# Patient Record
Sex: FEMALE | Race: WHITE | Employment: FULL TIME | URBAN - METROPOLITAN AREA
[De-identification: names, ages, dates, MRNs, and addresses within clinical notes are randomized per-mention and may not be internally consistent; named-entity substitution may affect disease eponyms.]

---

## 2017-07-06 ENCOUNTER — OFFICE VISIT (OUTPATIENT)
Dept: FAMILY MEDICINE CLINIC | Age: 27
End: 2017-07-06

## 2017-07-06 ENCOUNTER — HOSPITAL ENCOUNTER (OUTPATIENT)
Dept: LAB | Age: 27
Discharge: HOME | End: 2017-07-06
Payer: COMMERCIAL

## 2017-07-06 VITALS
TEMPERATURE: 98.2 F | OXYGEN SATURATION: 98 % | HEART RATE: 64 BPM | HEIGHT: 69 IN | WEIGHT: 230 LBS | SYSTOLIC BLOOD PRESSURE: 138 MMHG | BODY MASS INDEX: 34.07 KG/M2 | RESPIRATION RATE: 16 BRPM | DIASTOLIC BLOOD PRESSURE: 86 MMHG

## 2017-07-06 DIAGNOSIS — F43.9 STRESS: ICD-10-CM

## 2017-07-06 DIAGNOSIS — Z13.1 SCREENING FOR DIABETES MELLITUS (DM): ICD-10-CM

## 2017-07-06 DIAGNOSIS — I10 ESSENTIAL HYPERTENSION: ICD-10-CM

## 2017-07-06 DIAGNOSIS — Z86.59 HISTORY OF DEPRESSION: ICD-10-CM

## 2017-07-06 DIAGNOSIS — I10 ESSENTIAL HYPERTENSION: Primary | ICD-10-CM

## 2017-07-06 DIAGNOSIS — R00.2 PALPITATION: ICD-10-CM

## 2017-07-06 DIAGNOSIS — Z13.220 SCREENING FOR LIPID DISORDERS: ICD-10-CM

## 2017-07-06 LAB
ATRIAL RATE: 61 BPM
CALCULATED P AXIS, ECG09: 11 DEGREES
CALCULATED R AXIS, ECG10: 33 DEGREES
CALCULATED T AXIS, ECG11: 22 DEGREES
DIAGNOSIS, 93000: NORMAL
P-R INTERVAL, ECG05: 156 MS
Q-T INTERVAL, ECG07: 418 MS
QRS DURATION, ECG06: 90 MS
QTC CALCULATION (BEZET), ECG08: 420 MS
VENTRICULAR RATE, ECG03: 61 BPM

## 2017-07-06 PROCEDURE — 93005 ELECTROCARDIOGRAM TRACING: CPT

## 2017-07-06 NOTE — MR AVS SNAPSHOT
Visit Information Date & Time Provider Department Dept. Phone Encounter #  
 7/6/2017  1:00 PM Alton Flores, [address] [id number] Follow-up Instructions Return in about 2 weeks (around 7/20/2017), or if symptoms worsen or fail to improve. Upcoming Health Maintenance Date Due  
 PAP AKA CERVICAL CYTOLOGY 1/14/2009 INFLUENZA AGE 9 TO ADULT 8/1/2017 DTaP/Tdap/Td series (2 - Td) 9/9/2025 Allergies as of 7/6/2017  Review Complete On: 7/6/2017 By: Alton Flores MD  
 No Known Allergies Current Immunizations  Reviewed on 8/2/2015 Name Date Influenza Vaccine 10/8/2015 TB Skin Test (PPD) Intradermal 2/17/2016 Tdap 9/9/2015 Not reviewed this visit You Were Diagnosed With   
  
 Codes Comments Essential hypertension    -  Primary ICD-10-CM: I10 
ICD-9-CM: 401.9 Screening for lipid disorders     ICD-10-CM: Z13.220 ICD-9-CM: V77.91 Screening for diabetes mellitus (DM)     ICD-10-CM: Z13.1 ICD-9-CM: V77.1 Stress     ICD-10-CM: F43.9 ICD-9-CM: V62.89 History of depression     ICD-10-CM: Z86.59 
ICD-9-CM: V11.8 Palpitation     ICD-10-CM: R00.2 ICD-9-CM: 785.1 Vitals BP Pulse Temp Resp Height(growth percentile) Weight(growth percentile) 138/86 (BP 1 Location: Left arm, BP Patient Position: Sitting) 64 98.2 °F (36.8 °C) (Oral) 16 5' 9\" (1.753 m) 230 lb (104.3 kg) LMP SpO2 Breastfeeding? BMI OB Status Smoking Status 07/06/2017 (Approximate) 98% No 33.97 kg/m2 Having regular periods Never Smoker Vitals History BMI and BSA Data Body Mass Index Body Surface Area  
 33.97 kg/m 2 2.25 m 2 Preferred Pharmacy Pharmacy Name Phone [phone], [address] [phone] Your Updated Medication List  
  
   
This list is accurate as of: 7/6/17  1:37 PM.  Always use your most recent med list.  
  
  
  
  
 chlorthalidone 25 mg tablet Commonly known as:  Shearon Dayhoff Take 1 Tab by mouth daily. sertraline 50 mg tablet Commonly known as:  ZOLOFT Take 1 Tab by mouth daily. Follow-up Instructions Return in about 2 weeks (around 7/20/2017), or if symptoms worsen or fail to improve. To-Do List   
 07/06/2017 ECG:  EKG, 12 LEAD, INITIAL   
  
 07/06/2017 Lab:  HEMOGLOBIN A1C W/O EAG   
  
 07/06/2017 Lab:  LIPID PANEL   
  
 07/06/2017 Lab:  METABOLIC PANEL, COMPREHENSIVE   
  
 07/06/2017 Lab:  TSH 3RD GENERATION Referral Information Referral ID Referred By Referred To  
  
 [id number] Kishan Vivian PARRY Not Available Visits Status Start Date End Date 1 New Request 7/6/17 7/6/18 If your referral has a status of pending review or denied, additional information will be sent to support the outcome of this decision. Patient Instructions DASH Diet: Care Instructions Your Care Instructions The DASH diet is an eating plan that can help lower your blood pressure. DASH stands for Dietary Approaches to Stop Hypertension. Hypertension is high blood pressure. The DASH diet focuses on eating foods that are high in calcium, potassium, and magnesium. These nutrients can lower blood pressure. The foods that are highest in these nutrients are fruits, vegetables, low-fat dairy products, nuts, seeds, and legumes. But taking calcium, potassium, and magnesium supplements instead of eating foods that are high in those nutrients does not have the same effect. The DASH diet also includes whole grains, fish, and poultry. The DASH diet is one of several lifestyle changes your doctor may recommend to lower your high blood pressure. Your doctor may also want you to decrease the amount of sodium in your diet. Lowering sodium while following the DASH diet can lower blood pressure even further than just the DASH diet alone. Follow-up care is a key part of your treatment and safety. Be sure to make and go to all appointments, and call your doctor if you are having problems. It's also a good idea to know your test results and keep a list of the medicines you take. How can you care for yourself at home? Following the DASH diet · Eat 4 to 5 servings of fruit each day. A serving is 1 medium-sized piece of fruit, ½ cup chopped or canned fruit, 1/4 cup dried fruit, or 4 ounces (½ cup) of fruit juice. Choose fruit more often than fruit juice. · Eat 4 to 5 servings of vegetables each day. A serving is 1 cup of lettuce or raw leafy vegetables, ½ cup of chopped or cooked vegetables, or 4 ounces (½ cup) of vegetable juice. Choose vegetables more often than vegetable juice. · Get 2 to 3 servings of low-fat and fat-free dairy each day. A serving is 8 ounces of milk, 1 cup of yogurt, or 1 ½ ounces of cheese. · Eat 6 to 8 servings of grains each day. A serving is 1 slice of bread, 1 ounce of dry cereal, or ½ cup of cooked rice, pasta, or cooked cereal. Try to choose whole-grain products as much as possible. · Limit lean meat, poultry, and fish to 2 servings each day. A serving is 3 ounces, about the size of a deck of cards. · Eat 4 to 5 servings of nuts, seeds, and legumes (cooked dried beans, lentils, and split peas) each week. A serving is 1/3 cup of nuts, 2 tablespoons of seeds, or ½ cup of cooked beans or peas. · Limit fats and oils to 2 to 3 servings each day. A serving is 1 teaspoon of vegetable oil or 2 tablespoons of salad dressing. · Limit sweets and added sugars to 5 servings or less a week. A serving is 1 tablespoon jelly or jam, ½ cup sorbet, or 1 cup of lemonade. · Eat less than 2,300 milligrams (mg) of sodium a day. If you limit your sodium to 1,500 mg a day, you can lower your blood pressure even more. Tips for success · Start small.  Do not try to make dramatic changes to your diet all at once. You might feel that you are missing out on your favorite foods and then be more likely to not follow the plan. Make small changes, and stick with them. Once those changes become habit, add a few more changes. · Try some of the following: ¨ Make it a goal to eat a fruit or vegetable at every meal and at snacks. This will make it easy to get the recommended amount of fruits and vegetables each day. ¨ Try yogurt topped with fruit and nuts for a snack or healthy dessert. ¨ Add lettuce, tomato, cucumber, and onion to sandwiches. ¨ Combine a ready-made pizza crust with low-fat mozzarella cheese and lots of vegetable toppings. Try using tomatoes, squash, spinach, broccoli, carrots, cauliflower, and onions. ¨ Have a variety of cut-up vegetables with a low-fat dip as an appetizer instead of chips and dip. ¨ Sprinkle sunflower seeds or chopped almonds over salads. Or try adding chopped walnuts or almonds to cooked vegetables. ¨ Try some vegetarian meals using beans and peas. Add garbanzo or kidney beans to salads. Make burritos and tacos with mashed saxena beans or black beans. Where can you learn more? Go to http://neeraj-sue.info/. Enter I692 in the search box to learn more about \"DASH Diet: Care Instructions. \" Current as of: April 3, 2017 Content Version: 11.3 © 9920-9454 [employer]etch [employer]. Care instructions adapted under license by [employer] (which disclaims liability or warranty for this information). If you have questions about a medical condition or this instruction, always ask your healthcare professional. [place] [address] any warranty or liability for your use of this information. Introducing [facility]! Lan Cha introduces [employer] patient portal. Now you can access parts of your medical record, email your doctor's office, and request medication refills online.    
 
1. In your internet browser, go to [email or web address] 2. Click on the First Time User? Click Here link in the Sign In box. You will see the New Member Sign Up page. 3. Enter your [employer] Access Code exactly as it appears below. You will not need to use this code after youve completed the sign-up process. If you do not sign up before the expiration date, you must request a new code. · [employer] Access Code: [email or web address] Expires: 10/4/2017  1:34 PM 
 
4. Enter the last four digits of your Social Security Number (xxxx) and Date of Birth (mm/dd/yyyy) as indicated and click Submit. You will be taken to the next sign-up page. 5. Create a Newzstandt ID. This will be your [employer] login ID and cannot be changed, so think of one that is secure and easy to remember. 6. Create a [employer] password. You can change your password at any time. 7. Enter your Password Reset Question and Answer. This can be used at a later time if you forget your password. 8. Enter your e-mail address. You will receive e-mail notification when new information is available in [address] 9. Click Sign Up. You can now view and download portions of your medical record. 10. Click the Download Summary menu link to download a portable copy of your medical information. If you have questions, please visit the Frequently Asked Questions section of the [employer] website. Remember, [employer] is NOT to be used for urgent needs. For medical emergencies, dial 911. Now available from your iPhone and Android! Please provide this summary of care documentation to your next provider. Your primary care clinician is listed as Xiomara Rodriguez. If you have any questions after today's visit, please call [phone].

## 2017-07-06 NOTE — PATIENT INSTRUCTIONS

## 2017-07-06 NOTE — PROGRESS NOTES
Chief Complaint   Patient presents with    Headache    Blood Pressure Check     159/80's     1. Have you been to the ER, urgent care clinic since your last visit? Hospitalized since your last visit? No    2. Have you seen or consulted any other health care providers outside of the [address] [place] [address] since your last visit? Include any pap smears or colon screening.  No

## 2017-07-06 NOTE — PROGRESS NOTES
Thuan Caldera, 32 y.o.,  female    SUBJECTIVE  [facility], was seeing dr. Kerri florian towards end of her pregnancy 2 years ago. Was on hygroten which she discontinued a year ago, was lost to ff-up. She reports occasional headache and palpitations. She checked with [employer] [facility] recently. She denies cp, sob, lightheadedness or syncope. She has had episodes of  palpitations lasting few seconds described as pounding chest up to her throat few nights ago while laying in bed. She says diet needs improvement and aware of low salt diet recommendation. Also reports history of depression after birth of baby was on zoloft. She has discontinued medication and would rather do counseling. She is currently seeing therapist at [facility]. ROS:  See HPI, all others negative        Patient Active Problem List   Diagnosis Code    Hypertension in pregnancy, pre-existing, with delivery O10.92       Current Outpatient Prescriptions   Medication Sig Dispense Refill    chlorthalidone (HYGROTEN) 25 mg tablet Take 1 Tab by mouth daily. 90 Tab 1    sertraline (ZOLOFT) 50 mg tablet Take 1 Tab by mouth daily. 80 Tab 1       No Known Allergies    Past Medical History:   Diagnosis Date    Essential hypertension 11/2014    gestational hypertension; baby delivered 7/31/2015       Social History     Social History    Marital status:      Spouse name: N/A    Number of children: N/A    Years of education: N/A     Occupational History    Not on file.      Social History Main Topics    Smoking status: Never Smoker    Smokeless tobacco: Never Used    Alcohol use No      Comment: socially    Drug use: No    Sexual activity: Yes     Partners: Male     Birth control/ protection: None     Other Topics Concern    Not on file     Social History Narrative       Family History   Problem Relation Age of Onset    Hypertension Mother          OBJECTIVE    Physical Exam:     Visit Vitals    /86 (BP 1 Location: Left arm, BP Patient Position: Sitting)    Pulse 64    Temp 98.2 °F (36.8 °C) (Oral)    Resp 16    Ht 5' 9\" (1.753 m)    Wt 230 lb (104.3 kg)    LMP 07/06/2017 (Approximate)    SpO2 98%    Breastfeeding No    BMI 33.97 kg/m2       General: alert, well-appearing, obese, in no apparent distress or pain  Head: atraumatic. Non-tender maxillary and frontal sinuses  Eyes: Lids with no discharge, no matting, conjunctivae clear and non injected, full EOMs, PERLLA  Ears: pinna non-tender, external auditory canal patent, TM intact  Mouth/throat:tonsils non enlarged, pharynx non erythematous and no lesion, nasal mucosa normal  Neck: supple, no adenopathy palpated  CVS: normal rate, regular rhythm, distinct S1 and S2  Lungs:clear to ausculation bilaterally, no crackles, wheezing or rhonchi noted  Abdomen: normoactive bowel sounds, soft, non-tender  Extremities: no edema, no cyanosis,  Skin: warm, no lesions, rashes noted  Psych:  mood and affect normal        ASSESSMENT/PLAN  Samina Dunne was seen today for headache and blood pressure check. Diagnoses and all orders for this visit:    Essential hypertension  Off hygroten past year  Advised BP Log, DASH diet  -     METABOLIC PANEL, COMPREHENSIVE; Future  -     LIPID PANEL; Future  -     TSH 3RD GENERATION; Future  -     EKG, 12 LEAD, INITIAL; Future    Screening for lipid disorders  -     LIPID PANEL; Future    Screening for diabetes mellitus (DM)  -     METABOLIC PANEL, COMPREHENSIVE; Future  -     HEMOGLOBIN A1C W/O EAG; Future    Stress  Cont care with therapist    History of depression    Palpitation  -     EKG, 12 LEAD, INITIAL; Future        Follow-up Disposition:  Return in about 2 weeks (around 7/20/2017), or if symptoms worsen or fail to improve. Patient understands plan of care. Patient has provided input and agrees with goals.

## 2017-07-07 ENCOUNTER — TELEPHONE (OUTPATIENT)
Dept: FAMILY MEDICINE CLINIC | Age: 27
End: 2017-07-07

## 2017-07-07 NOTE — TELEPHONE ENCOUNTER
Patient called this afternoon returning nurse Celena's phone call. Please call her back at your earliest convenience.

## 2018-02-07 ENCOUNTER — OFFICE VISIT (OUTPATIENT)
Dept: FAMILY MEDICINE CLINIC | Age: 28
End: 2018-02-07

## 2018-02-07 VITALS
BODY MASS INDEX: 31.84 KG/M2 | WEIGHT: 215 LBS | HEIGHT: 69 IN | TEMPERATURE: 98.3 F | SYSTOLIC BLOOD PRESSURE: 116 MMHG | HEART RATE: 56 BPM | RESPIRATION RATE: 16 BRPM | DIASTOLIC BLOOD PRESSURE: 78 MMHG | OXYGEN SATURATION: 99 %

## 2018-02-07 DIAGNOSIS — J06.9 VIRAL UPPER RESPIRATORY TRACT INFECTION: Primary | ICD-10-CM

## 2018-02-07 LAB
S PYO AG THROAT QL: NEGATIVE
VALID INTERNAL CONTROL?: YES

## 2018-02-07 NOTE — PATIENT INSTRUCTIONS
Upper Respiratory Infection (Cold): Care Instructions  Your Care Instructions    An upper respiratory infection, or URI, is an infection of the nose, sinuses, or throat. URIs are spread by coughs, sneezes, and direct contact. The common cold is the most frequent kind of URI. The flu and sinus infections are other kinds of URIs. Almost all URIs are caused by viruses. Antibiotics won't cure them. But you can treat most infections with home care. This may include drinking lots of fluids and taking over-the-counter pain medicine. You will probably feel better in 4 to 10 days. The doctor has checked you carefully, but problems can develop later. If you notice any problems or new symptoms, get medical treatment right away. Follow-up care is a key part of your treatment and safety. Be sure to make and go to all appointments, and call your doctor if you are having problems. It's also a good idea to know your test results and keep a list of the medicines you take. How can you care for yourself at home? · To prevent dehydration, drink plenty of fluids, enough so that your urine is light yellow or clear like water. Choose water and other caffeine-free clear liquids until you feel better. If you have kidney, heart, or liver disease and have to limit fluids, talk with your doctor before you increase the amount of fluids you drink. · Take an over-the-counter pain medicine, such as acetaminophen (Tylenol), ibuprofen (Advil, Motrin), or naproxen (Aleve). Read and follow all instructions on the label. · Before you use cough and cold medicines, check the label. These medicines may not be safe for young children or for people with certain health problems. · Be careful when taking over-the-counter cold or flu medicines and Tylenol at the same time. Many of these medicines have acetaminophen, which is Tylenol. Read the labels to make sure that you are not taking more than the recommended dose.  Too much acetaminophen (Tylenol) can be harmful. · Get plenty of rest.  · Do not smoke or allow others to smoke around you. If you need help quitting, talk to your doctor about stop-smoking programs and medicines. These can increase your chances of quitting for good. When should you call for help? Call 911 anytime you think you may need emergency care. For example, call if:  ? · You have severe trouble breathing. ?Call your doctor now or seek immediate medical care if:  ? · You seem to be getting much sicker. ? · You have new or worse trouble breathing. ? · You have a new or higher fever. ? · You have a new rash. ? Watch closely for changes in your health, and be sure to contact your doctor if:  ? · You have a new symptom, such as a sore throat, an earache, or sinus pain. ? · You cough more deeply or more often, especially if you notice more mucus or a change in the color of your mucus. ? · You do not get better as expected. Where can you learn more? Go to http://neeraj-sue.info/. Enter F008 in the search box to learn more about \"Upper Respiratory Infection (Cold): Care Instructions. \"  Current as of: May 12, 2017  Content Version: 11.4  © 0049-6604 [employer]. Care instructions adapted under license by [employer] (which disclaims liability or warranty for this information). If you have questions about a medical condition or this instruction, always ask your healthcare professional. [place] [address] any warranty or liability for your use of this information.

## 2018-02-07 NOTE — MR AVS SNAPSHOT
[address] 
[phone] Patient: Delroy Sheth MRN: [id number] WEO:[birth year] Visit Information Date & Time Provider Department Dept. Phone Encounter #  
 2/7/2018  2:00 PM Talia Gardner, [address] [place] [address] Road [id number] Follow-up Instructions Return in about 1 month (around 3/7/2018) for routine follow-up. Upcoming Health Maintenance Date Due  
 PAP AKA CERVICAL CYTOLOGY 1/14/2009 DTaP/Tdap/Td series (2 - Td) 9/9/2025 Allergies as of 2/7/2018  Review Complete On: 2/7/2018 By: MILI Wang No Known Allergies Current Immunizations  Reviewed on 8/2/2015 Name Date Influenza Vaccine 10/8/2015 TB Skin Test (PPD) Intradermal 2/17/2016 Tdap 9/9/2015 Not reviewed this visit You Were Diagnosed With   
  
 Codes Comments Viral upper respiratory tract infection    -  Primary ICD-10-CM: J06.9, B97.89 ICD-9-CM: 465.9 Vitals BP Pulse Temp Resp Height(growth percentile) Weight(growth percentile) 116/78 (BP 1 Location: Left arm, BP Patient Position: Sitting) (!) 56 98.3 °F (36.8 °C) (Oral) 16 5' 9\" (1.753 m) 215 lb (97.5 kg) SpO2 BMI OB Status Smoking Status 99% 31.75 kg/m2 Having regular periods Never Smoker BMI and BSA Data Body Mass Index Body Surface Area 31.75 kg/m 2 2.18 m 2 Preferred Pharmacy Pharmacy Name Phone Ange Sanchez [address], [facility] [address] [facility] [phone] Your Updated Medication List  
  
   
This list is accurate as of: 2/7/18  2:02 PM.  Always use your most recent med list.  
  
  
  
  
 chlorthalidone 25 mg tablet Commonly known as:  Clint Crater Take 1 Tab by mouth daily. sertraline 50 mg tablet Commonly known as:  ZOLOFT Take 1 Tab by mouth daily. We Performed the Following AMB POC RAPID STREP A [69932 CPT(R)] Follow-up Instructions Return in about 1 month (around 3/7/2018) for routine follow-up. Patient Instructions Upper Respiratory Infection (Cold): Care Instructions Your Care Instructions An upper respiratory infection, or URI, is an infection of the nose, sinuses, or throat. URIs are spread by coughs, sneezes, and direct contact. The common cold is the most frequent kind of URI. The flu and sinus infections are other kinds of URIs. Almost all URIs are caused by viruses. Antibiotics won't cure them. But you can treat most infections with home care. This may include drinking lots of fluids and taking over-the-counter pain medicine. You will probably feel better in 4 to 10 days. The doctor has checked you carefully, but problems can develop later. If you notice any problems or new symptoms, get medical treatment right away. Follow-up care is a key part of your treatment and safety. Be sure to make and go to all appointments, and call your doctor if you are having problems. It's also a good idea to know your test results and keep a list of the medicines you take. How can you care for yourself at home? · To prevent dehydration, drink plenty of fluids, enough so that your urine is light yellow or clear like water. Choose water and other caffeine-free clear liquids until you feel better. If you have kidney, heart, or liver disease and have to limit fluids, talk with your doctor before you increase the amount of fluids you drink. · Take an over-the-counter pain medicine, such as acetaminophen (Tylenol), ibuprofen (Advil, Motrin), or naproxen (Aleve). Read and follow all instructions on the label. · Before you use cough and cold medicines, check the label. These medicines may not be safe for young children or for people with certain health problems.  
· Be careful when taking over-the-counter cold or flu medicines and Tylenol at the same time. Many of these medicines have acetaminophen, which is Tylenol. Read the labels to make sure that you are not taking more than the recommended dose. Too much acetaminophen (Tylenol) can be harmful. · Get plenty of rest. 
· Do not smoke or allow others to smoke around you. If you need help quitting, talk to your doctor about stop-smoking programs and medicines. These can increase your chances of quitting for good. When should you call for help? Call 911 anytime you think you may need emergency care. For example, call if: 
? · You have severe trouble breathing. ?Call your doctor now or seek immediate medical care if: 
? · You seem to be getting much sicker. ? · You have new or worse trouble breathing. ? · You have a new or higher fever. ? · You have a new rash. ? Watch closely for changes in your health, and be sure to contact your doctor if: 
? · You have a new symptom, such as a sore throat, an earache, or sinus pain. ? · You cough more deeply or more often, especially if you notice more mucus or a change in the color of your mucus. ? · You do not get better as expected. Where can you learn more? Go to http://neeraj-sue.info/. Enter N597 in the search box to learn more about \"Upper Respiratory Infection (Cold): Care Instructions. \" Current as of: May 12, 2017 Content Version: 11.4 © 6248-3570 Nextcar.com. Care instructions adapted under license by [employer] (which disclaims liability or warranty for this information). If you have questions about a medical condition or this instruction, always ask your healthcare professional. [address] any warranty or liability for your use of this information. Introducing [facility] & HEALTH SERVICES! [facility] introduces Just Above Cost patient portal. Now you can access parts of your medical record, email your doctor's office, and request medication refills online. 1. In your internet browser, go to [email or web address] 2. Click on the First Time User? Click Here link in the Sign In box. You will see the New Member Sign Up page. 3. Enter your [employer] Access Code exactly as it appears below. You will not need to use this code after youve completed the sign-up process. If you do not sign up before the expiration date, you must request a new code. · [employer] Access Code: [email or web address] Expires: 5/8/2018  2:02 PM 
 
4. Enter the last four digits of your Social Security Number (xxxx) and Date of Birth (mm/dd/yyyy) as indicated and click Submit. You will be taken to the next sign-up page. 5. Create a CardioGenicst ID. This will be your [employer] login ID and cannot be changed, so think of one that is secure and easy to remember. 6. Create a [employer] password. You can change your password at any time. 7. Enter your Password Reset Question and Answer. This can be used at a later time if you forget your password. 8. Enter your e-mail address. You will receive e-mail notification when new information is available in [address] 9. Click Sign Up. You can now view and download portions of your medical record. 10. Click the Download Summary menu link to download a portable copy of your medical information. If you have questions, please visit the Frequently Asked Questions section of the [employer] website. Remember, [employer] is NOT to be used for urgent needs. For medical emergencies, dial 911. Now available from your iPhone and Android! Please provide this summary of care documentation to your next provider. Your primary care clinician is listed as Savita Gutierrez. If you have any questions after today's visit, please call [phone].

## 2018-02-07 NOTE — PROGRESS NOTES
Chief Complaint   Patient presents with    Flu     flu like symptoms    Sore Throat     son + strep

## 2018-02-12 ENCOUNTER — OFFICE VISIT (OUTPATIENT)
Dept: FAMILY MEDICINE CLINIC | Age: 28
End: 2018-02-12

## 2018-02-12 VITALS
WEIGHT: 218.6 LBS | HEIGHT: 69 IN | BODY MASS INDEX: 32.38 KG/M2 | DIASTOLIC BLOOD PRESSURE: 76 MMHG | SYSTOLIC BLOOD PRESSURE: 118 MMHG | HEART RATE: 75 BPM | RESPIRATION RATE: 12 BRPM | TEMPERATURE: 98.2 F | OXYGEN SATURATION: 98 %

## 2018-02-12 DIAGNOSIS — J01.20 SUBACUTE ETHMOIDAL SINUSITIS: Primary | ICD-10-CM

## 2018-02-12 RX ORDER — AZITHROMYCIN 250 MG/1
TABLET, FILM COATED ORAL
Qty: 6 TAB | Refills: 0 | Status: SHIPPED | OUTPATIENT
Start: 2018-02-12 | End: 2018-02-17

## 2018-02-12 NOTE — MR AVS SNAPSHOT
[facility] [address] [facility] [facility] [address] 
[phone] Patient: Sammy Arora MRN: [id number] QDY:[birth year] Visit Information Date & Time Provider Department Dept. Phone Encounter #  
 2/12/2018  4:45 PM Timbo Perea MD [facility] [phone] [id number] Your Appointments 2/19/2018 11:00 AM  
FOLLOW UP EXAM with MILI Dominguez [facility] ([facility]) Appt Note: routine f/u wants to switch to YESIKA PEREZ [facility] [address] [place] [address] [address] [facility] [address]  
[address] [facility] [address] [facility] [address] Upcoming Health Maintenance Date Due  
 PAP AKA CERVICAL CYTOLOGY 1/14/2009 DTaP/Tdap/Td series (2 - Td) 9/9/2025 Allergies as of 2/12/2018  Review Complete On: 2/12/2018 By: Timbo Perea MD  
 No Known Allergies Current Immunizations  Reviewed on 8/2/2015 Name Date Influenza Vaccine 10/8/2015 TB Skin Test (PPD) Intradermal 2/17/2016 Tdap 9/9/2015 Not reviewed this visit You Were Diagnosed With   
  
 Codes Comments Subacute ethmoidal sinusitis    -  Primary ICD-10-CM: J01.20 ICD-9-CM: 461.2 Vitals BP Pulse Temp Resp Height(growth percentile) Weight(growth percentile) 118/76 (BP 1 Location: Right arm, BP Patient Position: Sitting) 75 98.2 °F (36.8 °C) (Oral) 12 5' 9\" (1.753 m) 218 lb 9.6 oz (99.2 kg) SpO2 BMI OB Status Smoking Status 98% 32.28 kg/m2 Having regular periods Never Smoker BMI and BSA Data Body Mass Index Body Surface Area  
 32.28 kg/m 2 2.2 m 2 Preferred Pharmacy Pharmacy Name Phone Ange Sanchez [address], [address] [facility] [address] [facility] [phone] Your Updated Medication List  
  
   
This list is accurate as of: 2/12/18  5:02 PM.  Always use your most recent med list.  
  
  
  
  
 azithromycin 250 mg tablet Commonly known as:  Laymond Grumet Take 2 tablets today, then take 1 tablet daily  
  
 chlorthalidone 25 mg tablet Commonly known as:  Rafael Ink Take 1 Tab by mouth daily. FLUARIX QUAD 6158-6760 (PF) Syrg injection Generic drug:  influenza vaccine 2017-18 (3 yrs+)(PF)  
inject 0.5 milliliter intramuscularly  
  
 sertraline 50 mg tablet Commonly known as:  ZOLOFT Take 1 Tab by mouth daily. Prescriptions Sent to Pharmacy Refills  
 azithromycin (ZITHROMAX) 250 mg tablet 0 Sig: Take 2 tablets today, then take 1 tablet daily Class: Normal  
 Pharmacy: Julian Snowden at [address] [facility], [address] [facility] #: [phone] Patient Instructions Sinusitis: Care Instructions Your Care Instructions Sinusitis is an infection of the lining of the sinus cavities in your head. Sinusitis often follows a cold. It causes pain and pressure in your head and face. In most cases, sinusitis gets better on its own in 1 to 2 weeks. But some mild symptoms may last for several weeks. Sometimes antibiotics are needed. Follow-up care is a key part of your treatment and safety. Be sure to make and go to all appointments, and call your doctor if you are having problems. It's also a good idea to know your test results and keep a list of the medicines you take. How can you care for yourself at home? · Take an over-the-counter pain medicine, such as acetaminophen (Tylenol), ibuprofen (Advil, Motrin), or naproxen (Aleve). Read and follow all instructions on the label. · If the doctor prescribed antibiotics, take them as directed. Do not stop taking them just because you feel better. You need to take the full course of antibiotics. · Be careful when taking over-the-counter cold or flu medicines and Tylenol at the same time. Many of these medicines have acetaminophen, which is Tylenol.  Read the labels to make sure that you are not taking more than the recommended dose. Too much acetaminophen (Tylenol) can be harmful. · Breathe warm, moist air from a steamy shower, a hot bath, or a sink filled with hot water. Avoid cold, dry air. Using a humidifier in your home may help. Follow the directions for cleaning the machine. · Use saline (saltwater) nasal washes to help keep your nasal passages open and wash out mucus and bacteria. You can buy saline nose drops at a grocery store or drugstore. Or you can make your own at home by adding 1 teaspoon of salt and 1 teaspoon of baking soda to 2 cups of distilled water. If you make your own, fill a bulb syringe with the solution, insert the tip into your nostril, and squeeze gently. Hoy Rash your nose. · Put a hot, wet towel or a warm gel pack on your face 3 or 4 times a day for 5 to 10 minutes each time. · Try a decongestant nasal spray like oxymetazoline (Afrin). Do not use it for more than 3 days in a row. Using it for more than 3 days can make your congestion worse. When should you call for help? Call your doctor now or seek immediate medical care if: 
? · You have new or worse swelling or redness in your face or around your eyes. ? · You have a new or higher fever. ? Watch closely for changes in your health, and be sure to contact your doctor if: 
? · You have new or worse facial pain. ? · The mucus from your nose becomes thicker (like pus) or has new blood in it. ? · You are not getting better as expected. Where can you learn more? Go to http://neeraj-sue.info/. Enter E452 in the search box to learn more about \"Sinusitis: Care Instructions. \" Current as of: May 12, 2017 Content Version: 11.4 © 5337-2519 Infiniu. Care instructions adapted under license by [employer] (which disclaims liability or warranty for this information).  If you have questions about a medical condition or this instruction, always ask your healthcare professional. [place] [address] any warranty or liability for your use of this information. Introducing [facility] & HEALTH SERVICES! [employer] introduces Asthmatx patient portal. Now you can access parts of your medical record, email your doctor's office, and request medication refills online. 1. In your internet browser, go to [email or web address] 2. Click on the First Time User? Click Here link in the Sign In box. You will see the New Member Sign Up page. 3. Enter your Asthmatx Access Code exactly as it appears below. You will not need to use this code after youve completed the sign-up process. If you do not sign up before the expiration date, you must request a new code. · Asthmatx Access Code: [email or web address] Expires: 5/8/2018  2:02 PM 
 
4. Enter the last four digits of your Social Security Number (xxxx) and Date of Birth (mm/dd/yyyy) as indicated and click Submit. You will be taken to the next sign-up page. 5. Create a Asthmatx ID. This will be your Asthmatx login ID and cannot be changed, so think of one that is secure and easy to remember. 6. Create a Asthmatx password. You can change your password at any time. 7. Enter your Password Reset Question and Answer. This can be used at a later time if you forget your password. 8. Enter your e-mail address. You will receive e-mail notification when new information is available in [address] 9. Click Sign Up. You can now view and download portions of your medical record. 10. Click the Download Summary menu link to download a portable copy of your medical information. If you have questions, please visit the Frequently Asked Questions section of the Asthmatx website. Remember, Asthmatx is NOT to be used for urgent needs. For medical emergencies, dial 911. Now available from your iPhone and Android! Please provide this summary of care documentation to your next provider. Your primary care clinician is listed as Teena Hassan. If you have any questions after today's visit, please call [phone].

## 2018-02-12 NOTE — PATIENT INSTRUCTIONS
Sinusitis: Care Instructions  Your Care Instructions    Sinusitis is an infection of the lining of the sinus cavities in your head. Sinusitis often follows a cold. It causes pain and pressure in your head and face. In most cases, sinusitis gets better on its own in 1 to 2 weeks. But some mild symptoms may last for several weeks. Sometimes antibiotics are needed. Follow-up care is a key part of your treatment and safety. Be sure to make and go to all appointments, and call your doctor if you are having problems. It's also a good idea to know your test results and keep a list of the medicines you take. How can you care for yourself at home? · Take an over-the-counter pain medicine, such as acetaminophen (Tylenol), ibuprofen (Advil, Motrin), or naproxen (Aleve). Read and follow all instructions on the label. · If the doctor prescribed antibiotics, take them as directed. Do not stop taking them just because you feel better. You need to take the full course of antibiotics. · Be careful when taking over-the-counter cold or flu medicines and Tylenol at the same time. Many of these medicines have acetaminophen, which is Tylenol. Read the labels to make sure that you are not taking more than the recommended dose. Too much acetaminophen (Tylenol) can be harmful. · Breathe warm, moist air from a steamy shower, a hot bath, or a sink filled with hot water. Avoid cold, dry air. Using a humidifier in your home may help. Follow the directions for cleaning the machine. · Use saline (saltwater) nasal washes to help keep your nasal passages open and wash out mucus and bacteria. You can buy saline nose drops at a grocery store or drugstore. Or you can make your own at home by adding 1 teaspoon of salt and 1 teaspoon of baking soda to 2 cups of distilled water. If you make your own, fill a bulb syringe with the solution, insert the tip into your nostril, and squeeze gently. Shirley Clap your nose.   · Put a hot, wet towel or a warm gel pack on your face 3 or 4 times a day for 5 to 10 minutes each time. · Try a decongestant nasal spray like oxymetazoline (Afrin). Do not use it for more than 3 days in a row. Using it for more than 3 days can make your congestion worse. When should you call for help? Call your doctor now or seek immediate medical care if:  ? · You have new or worse swelling or redness in your face or around your eyes. ? · You have a new or higher fever. ? Watch closely for changes in your health, and be sure to contact your doctor if:  ? · You have new or worse facial pain. ? · The mucus from your nose becomes thicker (like pus) or has new blood in it. ? · You are not getting better as expected. Where can you learn more? Go to http://neeraj-sue.info/. Enter C045 in the search box to learn more about \"Sinusitis: Care Instructions. \"  Current as of: May 12, 2017  Content Version: 11.4  © 9768-7903 Healthwise, Incorporated. Care instructions adapted under license by MedPageToday (which disclaims liability or warranty for this information). If you have questions about a medical condition or this instruction, always ask your healthcare professional. [address] any warranty or liability for your use of this information.

## 2018-02-12 NOTE — PROGRESS NOTES
Chief Complaint   Patient presents with    Cough     green phlem, headach, x6 days     1. Have you been to the ER, urgent care clinic since your last visit? Hospitalized since your last visit? No    2. Have you seen or consulted any other health care providers outside of the [address] [place] [address] since your last visit? Include any pap smears or colon screening.  No

## 2018-02-12 NOTE — PROGRESS NOTES
Delroy Sheth is a 25 y.o. female  presents for sinus pain. She has assoc cough. She has had sxs for several days. Sxs are getting worse. No Known Allergies  Outpatient Prescriptions Marked as Taking for the 2/12/18 encounter (Office Visit) with Kwaku García MD   Medication Sig Dispense Refill    FLUARIX QUAD 3670-3723, PF, syrg injection inject 0.5 milliliter intramuscularly  0    azithromycin (ZITHROMAX) 250 mg tablet Take 2 tablets today, then take 1 tablet daily 6 Tab 0    chlorthalidone (HYGROTEN) 25 mg tablet Take 1 Tab by mouth daily. 90 Tab 1    sertraline (ZOLOFT) 50 mg tablet Take 1 Tab by mouth daily. 90 Tab 1     Patient Active Problem List   Diagnosis Code    Hypertension in pregnancy, pre-existing, with delivery O10.92     Past Medical History:   Diagnosis Date    Essential hypertension 11/2014    gestational hypertension; baby delivered 7/31/2015     Social History     Social History    Marital status:      Spouse name: N/A    Number of children: N/A    Years of education: N/A     Social History Main Topics    Smoking status: Never Smoker    Smokeless tobacco: Never Used    Alcohol use No      Comment: socially    Drug use: No    Sexual activity: Yes     Partners: Male     Birth control/ protection: None     Other Topics Concern    Not on file     Social History Narrative     Family History   Problem Relation Age of Onset    Hypertension Mother         Review of Systems   Constitutional: Negative for chills and fever. Respiratory: Negative for cough. Cardiovascular: Negative for chest pain and palpitations. Gastrointestinal: Negative for constipation, diarrhea, nausea and vomiting.      Vitals:    02/12/18 1654   BP: 118/76   Pulse: 75   Resp: 12   Temp: 98.2 °F (36.8 °C)   TempSrc: Oral   SpO2: 98%   Weight: 218 lb 9.6 oz (99.2 kg)   Height: 5' 9\" (1.753 m)   PainSc:   0 - No pain       Physical Exam   Constitutional: She is oriented to person, place, and time and well-developed, well-nourished, and in no distress. Neck: Normal range of motion. Neck supple. Cardiovascular: Normal rate, regular rhythm and normal heart sounds. Pulmonary/Chest: Effort normal and breath sounds normal.   Neurological: She is alert and oriented to person, place, and time. Skin: Skin is warm and dry. Nursing note and vitals reviewed. Assessment/Plan      ICD-10-CM ICD-9-CM    1. Subacute ethmoidal sinusitis J01.20 461.2 azithromycin (ZITHROMAX) 250 mg tablet     Follow-up Disposition: Not on File  lab results and schedule of future lab studies reviewed with patient      I have discussed the diagnosis with the patient and the intended plan of care as seen in the above orders. The patient has received an after-visit summary and questions were answered concerning future plans. I have discussed medication, side effects, and warnings with the patient in detail. The patient verbalized understanding and is in agreement with the plan of care. The patient will contact the office with any additional concerns.       Raul Teague MD

## 2018-02-15 ENCOUNTER — OFFICE VISIT (OUTPATIENT)
Dept: FAMILY MEDICINE CLINIC | Age: 28
End: 2018-02-15

## 2018-02-15 VITALS
SYSTOLIC BLOOD PRESSURE: 120 MMHG | BODY MASS INDEX: 31.99 KG/M2 | OXYGEN SATURATION: 97 % | HEIGHT: 69 IN | RESPIRATION RATE: 20 BRPM | TEMPERATURE: 98.2 F | WEIGHT: 216 LBS | DIASTOLIC BLOOD PRESSURE: 83 MMHG | HEART RATE: 77 BPM

## 2018-02-15 DIAGNOSIS — Z13.31 POSITIVE DEPRESSION SCREENING: ICD-10-CM

## 2018-02-15 DIAGNOSIS — F41.0 PANIC ATTACKS: ICD-10-CM

## 2018-02-15 DIAGNOSIS — F43.0 ACUTE STRESS REACTION: Primary | ICD-10-CM

## 2018-02-15 DIAGNOSIS — F43.10 PTSD (POST-TRAUMATIC STRESS DISORDER): ICD-10-CM

## 2018-02-15 RX ORDER — LORAZEPAM 0.5 MG/1
0.5 TABLET ORAL
Qty: 10 TAB | Refills: 0 | Status: SHIPPED | OUTPATIENT
Start: 2018-02-15

## 2018-02-15 RX ORDER — SERTRALINE HYDROCHLORIDE 50 MG/1
50 TABLET, FILM COATED ORAL DAILY
Qty: 30 TAB | Refills: 1 | Status: SHIPPED | OUTPATIENT
Start: 2018-02-15

## 2018-02-15 NOTE — MR AVS SNAPSHOT
[address] [facility] 250 200 [facility] 
[phone] Patient: Yesenia Cuadra MRN: [id number] OBV:[id number]/[id number] Visit Information Date & Time Provider Department Dept. Phone Encounter #  
 2/15/2018 10:45 AM Jahaira Hammond, [address] [place] [address] [id number] Follow-up Instructions Return in about 4 weeks (around 3/15/2018) for Follow up for [facility] for an appointment [phone]. Upcoming Health Maintenance Date Due  
 PAP AKA CERVICAL CYTOLOGY 1/14/2009 DTaP/Tdap/Td series (2 - Td) 9/9/2025 Allergies as of 2/15/2018  Review Complete On: 2/15/2018 By: MILI Dorman No Known Allergies Current Immunizations  Reviewed on 8/2/2015 Name Date Influenza Vaccine 10/8/2015 TB Skin Test (PPD) Intradermal 2/17/2016 Tdap 9/9/2015 Not reviewed this visit You Were Diagnosed With   
  
 Codes Comments Acute stress reaction    -  Primary ICD-10-CM: F43.0 ICD-9-CM: 308.9 Panic attacks     ICD-10-CM: F41.0 ICD-9-CM: 300.01 PTSD (post-traumatic stress disorder)     ICD-10-CM: F43.10 ICD-9-CM: 309.81 Vitals BP Pulse Temp Resp Height(growth percentile) Weight(growth percentile) 120/83 77 98.2 °F (36.8 °C) (Oral) 20 5' 9\" (1.753 m) 216 lb (98 kg) LMP SpO2 BMI OB Status Smoking Status 02/03/2018 97% 31.9 kg/m2 Having regular periods Never Smoker Vitals History BMI and BSA Data Body Mass Index Body Surface Area 31.9 kg/m 2 2.18 m 2 Preferred Pharmacy Pharmacy Name Phone Ange Price  Corey Womack [address], [address] [facility] [address] [facility] [phone] Your Updated Medication List  
  
   
This list is accurate as of: 2/15/18 11:58 AM.  Always use your most recent med list.  
  
  
  
  
 azithromycin 250 mg tablet Commonly known as:  Barbra Francis Take 2 tablets today, then take 1 tablet daily LORazepam 0.5 mg tablet Commonly known as:  ATIVAN Take 1 Tab by mouth daily as needed for Anxiety. sertraline 50 mg tablet Commonly known as:  ZOLOFT Take 1 Tab by mouth daily. Prescriptions Printed Refills LORazepam (ATIVAN) 0.5 mg tablet 0 Sig: Take 1 Tab by mouth daily as needed for Anxiety. Class: Print Route: Oral  
  
Prescriptions Sent to Pharmacy Refills  
 sertraline (ZOLOFT) 50 mg tablet 1 Sig: Take 1 Tab by mouth daily. Class: Normal  
 Pharmacy: Julian Snowden at [address] [facility], [address] [place] [address] #: [phone] Route: Oral  
  
We Performed the Following REFERRAL TO PSYCHOLOGY [PWS99 Custom] Comments:  
 Please refer to Ronnie Flor for PTSD/anxiety Follow-up Instructions Return in about 4 weeks (around 3/15/2018) for Follow up for [address] [facility] for an appointment [phone]. Referral Information Referral ID Referred By Referred To  
  
 [id number] Filiberto IGLESIAS Not Available Visits Status Start Date End Date 1 New Request 2/15/18 2/15/19 If your referral has a status of pending review or denied, additional information will be sent to support the outcome of this decision. Introducing [facility]! [address] [facility] introduces [email or web address] patient portal. Now you can access parts of your medical record, email your doctor's office, and request medication refills online. 1. In your internet browser, go to [email or web address] 2. Click on the First Time User? Click Here link in the Sign In box. You will see the New Member Sign Up page. 3. Enter your [email or web address] Access Code exactly as it appears below. You will not need to use this code after youve completed the sign-up process. If you do not sign up before the expiration date, you must request a new code. · Hotelzilla Access Code: [email or web address] Expires: 5/8/2018  2:02 PM 
 
4. Enter the last four digits of your Social Security Number (xxxx) and Date of Birth (mm/dd/yyyy) as indicated and click Submit. You will be taken to the next sign-up page. 5. Create a Hotelzilla ID. This will be your Hotelzilla login ID and cannot be changed, so think of one that is secure and easy to remember. 6. Create a Hotelzilla password. You can change your password at any time. 7. Enter your Password Reset Question and Answer. This can be used at a later time if you forget your password. 8. Enter your e-mail address. You will receive e-mail notification when new information is available in [address] 9. Click Sign Up. You can now view and download portions of your medical record. 10. Click the Download Summary menu link to download a portable copy of your medical information. If you have questions, please visit the Frequently Asked Questions section of the Hotelzilla website. Remember, Hotelzilla is NOT to be used for urgent needs. For medical emergencies, dial 911. Now available from your iPhone and Android! Please provide this summary of care documentation to your next provider. Your primary care clinician is listed as Jonathan Read. If you have any questions after today's visit, please call [phone].

## 2018-02-15 NOTE — PROGRESS NOTES
Patient: Salome Frye MRN: [id number]  SSN: [id number]    YOB: 1990  Age: 25 y.o. Sex: female      Date of Service: 2/15/2018   Provider: MILI Umanzor   Office Location:   [address] [facility]  [address]  [place], [address]  Office Phone: [phone]  Office Fax: [phone]        REASON FOR VISIT:   Chief Complaint   Patient presents with    Panic Attack     Mood swings anxiety pt states \" that this is just overwhelming     Insomnia        VITALS:   Visit Vitals    /83    Pulse 77    Temp 98.2 °F (36.8 °C) (Oral)    Resp 20    Ht 5' 9\" (1.753 m)    Wt 216 lb (98 kg)    LMP 02/03/2018    SpO2 97%    BMI 31.9 kg/m2       MEDICATIONS:   Current Outpatient Prescriptions   Medication Sig Dispense Refill    azithromycin (ZITHROMAX) 250 mg tablet Take 2 tablets today, then take 1 tablet daily 6 Tab 0        ALLERGIES:   No Known Allergies     ACTIVE MEDICAL PROBLEMS:  Patient Active Problem List   Diagnosis Code    Hypertension in pregnancy, pre-existing, with delivery O10.92          HISTORY OF PRESENT ILLNESS:   Salome Frye is a 25 y.o. female who presents to the office for acute care. Patient presents today to discuss symptoms of anxiety. She reports a history of depression (specifically post-partum) and a working diagnosis of PTSD from some traumatic experiences during her time in the [employer], and her time working as a  with the [employer]. She reports that over the past few months, she has been starting to relive some of these traumatic past experiences. She has been having increased nightmares, and increased daytime anxiety.      Within the past week, her mother-in-law passed away unexpectedly due to complications from a heart valve replacement surgery at [facility], and this has been a big stressor on patient and her , as they have been driving back and forth to [place]   Patient states she experiences panic attacks, mostly when she is a passenger in a car. She worries excessively that the car is going to be T-boned. Over the summer, she had an initial visit at [facility], but never returned as she ultimately did not think it was going to be beneficial. She now thinks she is in a place where she might need therapy again. As far as meds, she has been on Zoloft and Prazosin in the past. She is somewhat hesitant to take meds on a daily basis, and does not want to have to wait several weeks for meds to take effect, as she is feeling at her worst right now. She and her  are also trying to conceive and she does not want to take anything that might affect pregnancy. She denies suicidal or homicidal ideation, but admits that she has been having some disturbing dreams in which she hurts her family members, and this is very distressing to her. PHQ over the last two weeks 2/15/2018   Little interest or pleasure in doing things More than half the days   Feeling down, depressed or hopeless More than half the days   Total Score PHQ 2 4          REVIEW OF SYSTEMS:  Review of Systems   Psychiatric/Behavioral: Positive for depression. Negative for hallucinations, memory loss, substance abuse ( patient denies any personal history of addiction, however she describes her father as an alcoholic) and suicidal ideas. The patient is nervous/anxious and has insomnia. PHYSICAL EXAMINATION:  Physical Exam   Constitutional: She is oriented to person, place, and time and well-developed, well-nourished, and in no distress. Neurological: She is alert and oriented to person, place, and time. Gait normal.   Skin: Skin is warm and dry. No rash noted. Psychiatric: Memory and affect normal.   Anxious, but appropriate        RESULTS:  No results found for this visit on 02/15/18. ASSESSMENT/PLAN:  Diagnoses and all orders for this visit:    1. Acute stress reaction  2. Panic attacks  3.  PTSD (post-traumatic stress disorder)  - Patient with some underlying anxiety and depression with history of multiple prior traumas  - Acute worsening of symptoms related to recent sudden loss of mother in law  - Discussed with patient that options for management include medications, therapy, or preferably, some combination of both   - Explained that it sounds as though she has some level of PTSD and that I am not well versed in the management of this, but for starters can help treat underlying depression/anxiety/panic attacks with SSRI. Will choose Zoloft as this is safest in pregnancy  - For more immediate relief of anxiety/panic attack symptoms, will also prescribe Ativan #10 to use sparingly as needed until therapeutic on SSRI. This is not to be refilled before her next appointment, so 10 pills should be more than enough for 30 days. - Will also initiate referral to [facility] for counseling, or alternatively patient may explore therapy options on her own, but I suspect this will be an important part of her treatment regimen  - Depending on response to the above interventions, may need to consider psychiatry referral at some point, but will defer this for now  - Patient counseled on risks/benefits/potential side effects of medications. Specifically addictive potential of benzos. Cautioned to use sparingly and not in combination with any other sedatives such as alcohol. Recommend discontinuation of this if she does become pregnant. Orders:    -     REFERRAL TO PSYCHOLOGY  -     LORazepam (ATIVAN) 0.5 mg tablet; Take 1 Tab by mouth daily as needed for Anxiety. -     sertraline (ZOLOFT) 50 mg tablet; Take 1 Tab by mouth daily. 4. Positive depression screening  Depression screen positive, patient instructed to schedule follow-up visit at this practice and medication was prescribed, drug therapy education given. All questions answered.  Patient expresses understanding and agrees with the plan as detailed above. Follow-up Disposition:  Return in about 4 weeks (around 3/15/2018) for Follow up for [address] [facility] for an appointment [phone]. A total of 28 minutes was spent with the patient, of which >50% was spent in counseling/coordinating care regarding management of anxiety symptoms.       PATIENT CARE TEAM:   Patient Care Team:  MILI Dominguez as PCP - General (Physician Assistant)  Provider Unknown  Uche Mesa MD (Obstetrics & Gynecology)       MILI Dominguez   February 15, 2018    12:24 PM

## 2023-01-24 NOTE — PROGRESS NOTES
Patient: Boogie Lomeli MRN: [id number]  SSN: [id number]    YOB: 1990  Age: 25 y.o. Sex: female      Date of Service: 2/7/2018   Provider: MILI Olivarez   Office Location:   [address] [facility] [address], [address]  Office Phone: [phone]  Office Fax: [phone]        REASON FOR VISIT:   Chief Complaint   Patient presents with    Flu     flu like symptoms    Sore Throat     son + strep        VITALS:   Visit Vitals    /78 (BP 1 Location: Left arm, BP Patient Position: Sitting)    Pulse (!) 56    Temp 98.3 °F (36.8 °C) (Oral)    Resp 16    Ht 5' 9\" (1.753 m)    Wt 215 lb (97.5 kg)    SpO2 99%    BMI 31.75 kg/m2       MEDICATIONS:   Current Outpatient Prescriptions   Medication Sig Dispense Refill    chlorthalidone (HYGROTEN) 25 mg tablet Take 1 Tab by mouth daily. 90 Tab 1    sertraline (ZOLOFT) 50 mg tablet Take 1 Tab by mouth daily. 90 Tab 1        ALLERGIES:   No Known Allergies     ACTIVE MEDICAL PROBLEMS:  Patient Active Problem List   Diagnosis Code    Hypertension in pregnancy, pre-existing, with delivery O10.92          HISTORY OF PRESENT ILLNESS:   Boogie Lomeli is a 25 y.o. female who presents to the office for acute care. PCP: Dr. Yesy Crow    Patient complains of dry cough, sore throat, malaise and fatigue. Symptoms started 2-3 days ago, but are worse today. Patient reports low grade fever on and off with Tmax 100.8 F. She has not been taking any OTC meds. Patient reports her son was recently diagnosed with strep throat and is being treated with abx currently. She has had no known exposure to the flu. Just had her flu shot about 2 weeks ago. Admits to nausea, but denies vomiting, diarrhea, chest pain, SOB, wheezing. REVIEW OF SYSTEMS:  Review of Systems   Constitutional: Positive for fever and malaise/fatigue. Negative for chills. HENT: Positive for sore throat.  Negative for congestion, ear pain and sinus pain.    Eyes: Negative for pain, discharge and redness. Respiratory: Positive for cough. Negative for sputum production, shortness of breath and wheezing. Cardiovascular: Negative for chest pain, palpitations and leg swelling. Gastrointestinal: Positive for nausea. Negative for abdominal pain, constipation, diarrhea and vomiting. Musculoskeletal: Negative for myalgias. Skin: Negative for itching and rash. PHYSICAL EXAMINATION:  Physical Exam   Constitutional: She is oriented to person, place, and time and well-developed, well-nourished, and in no distress. HENT:   Head: Normocephalic and atraumatic. Nose: Mucosal edema and rhinorrhea present. Right sinus exhibits no maxillary sinus tenderness and no frontal sinus tenderness. Left sinus exhibits no maxillary sinus tenderness and no frontal sinus tenderness. Mouth/Throat: Uvula is midline and mucous membranes are normal. Posterior oropharyngeal erythema present. No oropharyngeal exudate or posterior oropharyngeal edema. cerumen impaction Right ear   Eyes: Conjunctivae are normal. Pupils are equal, round, and reactive to light. Right eye exhibits no discharge. Left eye exhibits no discharge. Neck: Neck supple. Cardiovascular: Normal rate, regular rhythm, normal heart sounds and intact distal pulses. Exam reveals no gallop and no friction rub. No murmur heard. Pulmonary/Chest: Effort normal and breath sounds normal. She has no wheezes. She has no rales. Lymphadenopathy:     She has cervical adenopathy. Neurological: She is alert and oriented to person, place, and time. Gait normal.   Skin: Skin is warm and dry. No rash noted.    Psychiatric: Mood, memory and affect normal.        RESULTS:  Results for orders placed or performed in visit on 02/07/18   AMB POC RAPID STREP A   Result Value Ref Range    VALID INTERNAL CONTROL POC Yes     Group A Strep Ag Negative Negative        ASSESSMENT/PLAN:  Diagnoses and all orders for this visit: 1. Viral upper respiratory tract infection  - Rapid strep negative  - Discussed sending throat culture vs. treating supportively for now, patient prefers the latter and would like to avoid abx unless absolutely necessary  - Encouraged fluids, rest, and OTC meds as needed  Orders:    -     AMB POC RAPID STREP A    Patient advised she is due for a routine follow up    Follow-up Disposition:  Return in about 1 month (around 3/7/2018) for routine follow-up.      PATIENT CARE TEAM:   Patient Care Team:  Kemal Moore MD as PCP - General (Family Practice)  Provider Unknown  Best Pugh MD (Obstetrics & Gynecology)       [facility] [place]   February 7, 2018    2:10 PM stretcher

## 2024-01-06 ENCOUNTER — APPOINTMENT (OUTPATIENT)
Facility: HOSPITAL | Age: 34
End: 2024-01-06
Payer: COMMERCIAL

## 2024-01-06 ENCOUNTER — HOSPITAL ENCOUNTER (EMERGENCY)
Facility: HOSPITAL | Age: 34
Discharge: HOME | End: 2024-01-06
Payer: COMMERCIAL

## 2024-01-06 VITALS
BODY MASS INDEX: 39.99 KG/M2 | HEIGHT: 69 IN | TEMPERATURE: 98.4 F | RESPIRATION RATE: 18 BRPM | DIASTOLIC BLOOD PRESSURE: 92 MMHG | WEIGHT: 270 LBS | HEART RATE: 87 BPM | OXYGEN SATURATION: 96 % | SYSTOLIC BLOOD PRESSURE: 146 MMHG

## 2024-01-06 DIAGNOSIS — G43.911 INTRACTABLE MIGRAINE WITH STATUS MIGRAINOSUS, UNSPECIFIED MIGRAINE TYPE: Primary | ICD-10-CM

## 2024-01-06 PROCEDURE — 70450 CT HEAD/BRAIN W/O DYE: CPT

## 2024-01-06 PROCEDURE — 6360000002 HC RX W HCPCS: Performed by: NURSE PRACTITIONER

## 2024-01-06 PROCEDURE — 99284 EMERGENCY DEPT VISIT MOD MDM: CPT

## 2024-01-06 PROCEDURE — 96374 THER/PROPH/DIAG INJ IV PUSH: CPT

## 2024-01-06 PROCEDURE — 96375 TX/PRO/DX INJ NEW DRUG ADDON: CPT

## 2024-01-06 RX ORDER — KETOROLAC TROMETHAMINE 30 MG/ML
30 INJECTION, SOLUTION INTRAMUSCULAR; INTRAVENOUS
Status: COMPLETED | OUTPATIENT
Start: 2024-01-06 | End: 2024-01-06

## 2024-01-06 RX ORDER — METOCLOPRAMIDE HYDROCHLORIDE 5 MG/ML
10 INJECTION INTRAMUSCULAR; INTRAVENOUS ONCE
Status: COMPLETED | OUTPATIENT
Start: 2024-01-06 | End: 2024-01-06

## 2024-01-06 RX ORDER — DEXAMETHASONE SODIUM PHOSPHATE 10 MG/ML
10 INJECTION, SOLUTION INTRAMUSCULAR; INTRAVENOUS ONCE
Status: COMPLETED | OUTPATIENT
Start: 2024-01-06 | End: 2024-01-06

## 2024-01-06 RX ORDER — BUTALBITAL, ACETAMINOPHEN AND CAFFEINE 50; 325; 40 MG/1; MG/1; MG/1
1 TABLET ORAL EVERY 4 HOURS PRN
Qty: 20 TABLET | Refills: 0 | Status: SHIPPED | OUTPATIENT
Start: 2024-01-06

## 2024-01-06 RX ADMIN — DEXAMETHASONE SODIUM PHOSPHATE 10 MG: 10 INJECTION INTRAMUSCULAR; INTRAVENOUS at 13:24

## 2024-01-06 RX ADMIN — METOCLOPRAMIDE HYDROCHLORIDE 10 MG: 5 INJECTION INTRAMUSCULAR; INTRAVENOUS at 13:25

## 2024-01-06 RX ADMIN — KETOROLAC TROMETHAMINE 30 MG: 30 INJECTION INTRAMUSCULAR; INTRAVENOUS at 13:24

## 2024-01-06 ASSESSMENT — PAIN - FUNCTIONAL ASSESSMENT: PAIN_FUNCTIONAL_ASSESSMENT: 0-10

## 2024-01-06 ASSESSMENT — LIFESTYLE VARIABLES
HOW OFTEN DO YOU HAVE A DRINK CONTAINING ALCOHOL: NEVER
HOW MANY STANDARD DRINKS CONTAINING ALCOHOL DO YOU HAVE ON A TYPICAL DAY: PATIENT DOES NOT DRINK

## 2024-01-06 ASSESSMENT — PAIN SCALES - GENERAL: PAINLEVEL_OUTOF10: 8

## 2024-01-06 ASSESSMENT — PAIN DESCRIPTION - LOCATION: LOCATION: HEAD

## 2024-01-06 NOTE — ED TRIAGE NOTES
Present with headache states \"feels like my head is going to explode, comes and goes in waves started last summer\". Report having sinus infection.

## 2024-01-06 NOTE — ED PROVIDER NOTES
[facility] EMERGENCY DEPT  EMERGENCY DEPARTMENT HISTORY AND PHYSICAL EXAM      Date: 1/6/2024  Patient Name: Carson Sanchez  MRN: [id number]  YOB: 1990  Date of evaluation: 1/6/2024  Provider: TIGRE Ponce NP   Note Started: 2:24 PM EST 1/6/24    HISTORY OF PRESENT ILLNESS     Chief Complaint   Patient presents with    Headache       History Provided By: Patient    HPI: Carson Sanchez is a 33 y.o. female with a past medical history of headaches presents to the ER with a headache.  Patient complains of headache that started last summer.  Patient states it has been coming in waves.  Patient comes in for evaluation.    PAST MEDICAL HISTORY   Past Medical History:  No past medical history on file.    Past Surgical History:  No past surgical history on file.    Family History:  No family history on file.    Social History:       Allergies:  No Known Allergies    PCP: Sue Jimenez APRN - NP    Current Meds:   No current facility-administered medications for this encounter.     Current Outpatient Medications   Medication Sig Dispense Refill    butalbital-acetaminophen-caffeine (FIORICET, ESGIC) -40 MG per tablet Take 1 tablet by mouth every 4 hours as needed for Headaches 20 tablet 0       Social Determinants of Health:   Social Determinants of Health     Tobacco Use: Not on file   Alcohol Use: Not At Risk (1/6/2024)    AUDIT-C     Frequency of Alcohol Consumption: Never     Average Number of Drinks: Patient does not drink     Frequency of Binge Drinking: Never   Financial Resource Strain: Not on file   Food Insecurity: Not on file   Transportation Needs: Not on file   Physical Activity: Not on file   Stress: Not on file   Social Connections: Not on file   Intimate Partner Violence: Not on file   Depression: Not on file   Housing Stability: Not on file   Interpersonal Safety: Not on file   Utilities: Not on file       PHYSICAL EXAM   Physical Exam  Constitutional:       General: She is not in

## 2024-01-25 ENCOUNTER — OFFICE VISIT (OUTPATIENT)
Age: 34
End: 2024-01-25

## 2024-01-25 VITALS
SYSTOLIC BLOOD PRESSURE: 120 MMHG | BODY MASS INDEX: 39.25 KG/M2 | WEIGHT: 265 LBS | TEMPERATURE: 97.8 F | OXYGEN SATURATION: 100 % | HEIGHT: 69 IN | HEART RATE: 75 BPM | RESPIRATION RATE: 18 BRPM | DIASTOLIC BLOOD PRESSURE: 82 MMHG

## 2024-01-25 DIAGNOSIS — R42 DIZZINESS: ICD-10-CM

## 2024-01-25 DIAGNOSIS — R51.9 NEW ONSET HEADACHE: Primary | ICD-10-CM

## 2024-01-25 DIAGNOSIS — G43.111 INTRACTABLE MIGRAINE WITH AURA WITH STATUS MIGRAINOSUS: ICD-10-CM

## 2024-01-25 DIAGNOSIS — M48.02 CERVICAL STENOSIS OF SPINE: ICD-10-CM

## 2024-01-25 RX ORDER — LOSARTAN POTASSIUM 50 MG/1
50 TABLET ORAL DAILY
COMMUNITY

## 2024-01-25 RX ORDER — RIMEGEPANT SULFATE 75 MG/75MG
TABLET, ORALLY DISINTEGRATING ORAL
Qty: 8 TABLET | Refills: 5 | Status: SHIPPED | OUTPATIENT
Start: 2024-01-25

## 2024-01-25 ASSESSMENT — PATIENT HEALTH QUESTIONNAIRE - PHQ9
1. LITTLE INTEREST OR PLEASURE IN DOING THINGS: 0
2. FEELING DOWN, DEPRESSED OR HOPELESS: 0
SUM OF ALL RESPONSES TO PHQ QUESTIONS 1-9: 0
SUM OF ALL RESPONSES TO PHQ9 QUESTIONS 1 & 2: 0
SUM OF ALL RESPONSES TO PHQ QUESTIONS 1-9: 0

## 2024-01-25 NOTE — PROGRESS NOTES
Chief Complaint   Patient presents with    Headache     At least 8 per month, specifically on Left side of head radiates down left side of head- stabbing pain      Vitals:    01/25/24 1054   BP: 120/82   Pulse: 75   Resp: 18   Temp: 97.8 °F (36.6 °C)   SpO2: 100%       
fasiculations noted.     Reflexes: DTRs 2+ throughout.      MRI Result (most recent):  No results found for this or any previous visit from the past 3650 days.      CT Result (most recent):  CT HEAD WO CONTRAST 01/06/2024    Narrative  CLINICAL HISTORY: left sided headache  INDICATION: left sided headache  COMPARISON: 2010.  CT dose reduction was achieved through use of a standardized protocol tailored  for this examination and automatic exposure control for dose modulation.  TECHNIQUE: Serial axial images with a collimation of 5 mm were obtained from the  skull base through the vertex  FINDINGS:  The sulci and ventricles are within normal limits for patient age. There is no  evidence of an acute infarction, hemorrhage, or mass-effect. There is no  evidence of midline shift or hydrocephalus. Posterior fossa structures are  unremarkable. No extra-axial collections are seen.  Mastoid air cells are well pneumatized and clear.  There is right maxillary sinus disease.    Impression  No acute intracranial process.  There is no intracranial mass or hemorrhage.      EEG Result:      Carotid Doppler:        Recent Labs:  No results found for: \"WBC\", \"HGB\", \"HCT\", \"MCV\", \"PLT\"  No results found for: \"NA\", \"K\", \"CL\", \"CO2\", \"BUN\", \"CREATININE\", \"GLUCOSE\", \"CALCIUM\", \"PROT\", \"LABALBU\", \"BILITOT\", \"ALKPHOS\", \"AST\", \"ALT\", \"LABGLOM\", \"GFRAA\", \"AGRATIO\", \"GLOB\"    No results found for: \"CHOL\"  No results found for: \"TRIG\"  No results found for: \"HDL\"  No results found for: \"LDLCHOLESTEROL\", \"LDLCALC\"  No results found for: \"VLDL\"  No results found for: \"CHOLHDLRATIO\"    No results found for: \"SEDRATE\"    No results found for: \"LABA1C\"  No results found for: \"ERIK\"             1. New onset headache  -     MRI BRAIN W WO CONTRAST; Future  -     MRI CERVICAL SPINE WO CONTRAST; Future  2. Intractable migraine with aura with status migrainosus  -     MRI BRAIN W WO CONTRAST; Future  -     Rimegepant Sulfate (NURTEC) 75 MG TBDP; 1 tablet

## 2024-01-29 ENCOUNTER — TELEPHONE (OUTPATIENT)
Age: 34
End: 2024-01-29

## 2024-01-29 NOTE — TELEPHONE ENCOUNTER
RE: Nurtec - Approved    January 29, 2024 to January 28, 2025    KEY: [id number]     Waiting on approval letter.    Fyi to nurse.

## 2024-02-02 DIAGNOSIS — G43.111 INTRACTABLE MIGRAINE WITH AURA WITH STATUS MIGRAINOSUS: ICD-10-CM

## 2024-02-02 DIAGNOSIS — M48.02 CERVICAL STENOSIS OF SPINE: Primary | ICD-10-CM

## 2024-02-02 RX ORDER — DIAZEPAM 10 MG/1
TABLET ORAL
Qty: 2 TABLET | Refills: 0 | Status: SHIPPED | OUTPATIENT
Start: 2024-02-02 | End: 2024-03-01

## 2024-02-05 ENCOUNTER — HOSPITAL ENCOUNTER (OUTPATIENT)
Facility: HOSPITAL | Age: 34
Discharge: HOME | End: 2024-02-08
Payer: COMMERCIAL

## 2024-02-05 DIAGNOSIS — R42 DIZZINESS: ICD-10-CM

## 2024-02-05 DIAGNOSIS — R51.9 NEW ONSET HEADACHE: ICD-10-CM

## 2024-02-05 DIAGNOSIS — M48.02 CERVICAL STENOSIS OF SPINE: ICD-10-CM

## 2024-02-05 DIAGNOSIS — G43.111 INTRACTABLE MIGRAINE WITH AURA WITH STATUS MIGRAINOSUS: ICD-10-CM

## 2024-02-05 PROCEDURE — 6360000004 HC RX CONTRAST MEDICATION: Performed by: NURSE PRACTITIONER

## 2024-02-05 PROCEDURE — A9577 INJ MULTIHANCE: HCPCS | Performed by: NURSE PRACTITIONER

## 2024-02-05 PROCEDURE — 72141 MRI NECK SPINE W/O DYE: CPT

## 2024-02-05 PROCEDURE — 70553 MRI BRAIN STEM W/O & W/DYE: CPT

## 2024-02-05 RX ADMIN — GADOBENATE DIMEGLUMINE 20 ML: 529 INJECTION, SOLUTION INTRAVENOUS at 14:39

## 2024-04-18 ENCOUNTER — TELEPHONE (OUTPATIENT)
Age: 34
End: 2024-04-18

## 2024-05-13 ENCOUNTER — TELEPHONE (OUTPATIENT)
Age: 34
End: 2024-05-13

## 2024-05-13 NOTE — TELEPHONE ENCOUNTER
Called patient no answer. Left message stating that appointment on 7/19/2024 needs to be rescheduled due to the provider being out of office. Asked patient to call us back to reschedule next available.